# Patient Record
Sex: FEMALE | Race: WHITE | Employment: FULL TIME
[De-identification: names, ages, dates, MRNs, and addresses within clinical notes are randomized per-mention and may not be internally consistent; named-entity substitution may affect disease eponyms.]

---

## 2017-09-24 ENCOUNTER — HEALTH MAINTENANCE LETTER (OUTPATIENT)
Age: 27
End: 2017-09-24

## 2019-11-25 ENCOUNTER — OFFICE VISIT (OUTPATIENT)
Dept: URGENT CARE | Facility: URGENT CARE | Age: 29
End: 2019-11-25
Payer: COMMERCIAL

## 2019-11-25 VITALS
HEART RATE: 72 BPM | WEIGHT: 212 LBS | SYSTOLIC BLOOD PRESSURE: 121 MMHG | RESPIRATION RATE: 16 BRPM | BODY MASS INDEX: 34.07 KG/M2 | DIASTOLIC BLOOD PRESSURE: 68 MMHG | HEIGHT: 66 IN | OXYGEN SATURATION: 99 %

## 2019-11-25 DIAGNOSIS — N92.6 LATE PERIOD: Primary | ICD-10-CM

## 2019-11-25 DIAGNOSIS — N93.9 VAGINAL BLEEDING: ICD-10-CM

## 2019-11-25 LAB — HCG UR QL: POSITIVE

## 2019-11-25 PROCEDURE — 99203 OFFICE O/P NEW LOW 30 MIN: CPT | Performed by: FAMILY MEDICINE

## 2019-11-25 PROCEDURE — 81025 URINE PREGNANCY TEST: CPT | Performed by: FAMILY MEDICINE

## 2019-11-25 ASSESSMENT — MIFFLIN-ST. JEOR: SCORE: 1703.38

## 2019-11-25 NOTE — PROGRESS NOTES
"SUBJECTIVE: Alysa Kam is a 28 year old female presenting with a chief complaint of pregnancy and vaginal bleeding.  Onset of symptoms was day(s) ago.  Course of illness is same.    Severity moderate  Current and Associated symptoms: none  Treatment measures tried include None tried.  Predisposing factors include None.    No past medical history on file.  No Known Allergies  Social History     Tobacco Use     Smoking status: Never Smoker     Smokeless tobacco: Never Used   Substance Use Topics     Alcohol use: Not on file       ROS:  SKIN: no rash  GI: no vomiting    OBJECTIVE:  /68 (BP Location: Right arm, Patient Position: Sitting, Cuff Size: Adult Regular)   Pulse 72   Resp 16   Ht 1.676 m (5' 6\")   Wt 96.2 kg (212 lb)   LMP 10/16/2019   SpO2 99%   BMI 34.22 kg/m  GENERAL APPEARANCE: healthy, alert and no distress  ABDOMEN:  soft, nontender, no HSM or masses and bowel sounds normal  SKIN: no suspicious lesions or rashes      ICD-10-CM    1. Late period N92.6 HCG qualitative urine   2. Vaginal bleeding N93.9      Will go through ED for cont w/u    "